# Patient Record
Sex: FEMALE | Employment: OTHER | URBAN - METROPOLITAN AREA
[De-identification: names, ages, dates, MRNs, and addresses within clinical notes are randomized per-mention and may not be internally consistent; named-entity substitution may affect disease eponyms.]

---

## 2023-04-24 NOTE — PROGRESS NOTES
"ENT Consultation    Iona Montilla who is a 82 year old female seen in consultation at the request of herself.      History of Present Illness - Iona Montilla is a 82 year old female presents for relation of mostly right-sided maxillary sinus pressure discharge more on the right.  Initially was bilateral more anterior but now more on the right thick white secretions mostly postnasal gagging the patient.  Patient may have some allergies but was never tested.  She does not have typical hayfever symptoms however.  She has tried Flonase for over a month which did not help.  She was then given another spray in conjunction with Flonase to use twice daily which helped very little.  Sense of smell appears to be intact.  Her voice does gets raspy at times and she does experience globus sensation in the lower neck in the midline but no typical GERD symptoms.  Congestion is fine at night and is worse in the morning during the day.  She experienced some pressure in the right ear off-and-on occasional \"popping\".  Does not feel her hearing is changed.  Denies any tinnitus or vertigo.          BP Readings from Last 1 Encounters:   05/01/23 (!) 150/68       BP noted to be elevated today in office.  Patient to follow up with Primary Care provider regarding elevated blood pressure.    Iona IS NOT a smoker/uses chewing tobacco.        Past Medical History - No past medical history on file.    Current Medications -   Current Outpatient Medications:      amLODIPine (NORVASC) 10 MG tablet, Take 10 mg by mouth daily, Disp: , Rfl:      ezetimibe (ZETIA) 10 MG tablet, Take 10 mg by mouth daily, Disp: , Rfl:      levothyroxine (SYNTHROID/LEVOTHROID) 112 MCG tablet, Take 112 mcg by mouth daily, Disp: , Rfl:      losartan (COZAAR) 100 MG tablet, Take 100 mg by mouth daily, Disp: , Rfl:     Allergies - No Known Allergies    Social History -   Social History     Socioeconomic History     Marital status:        Family History - " "History reviewed. No pertinent family history.    Review of Systems - As per HPI and PMHx, otherwise review of system review of the head and neck negative. Otherwise 10+ review of system is negative    Physical Exam  BP (!) 150/68 (BP Location: Right arm, Patient Position: Sitting, Cuff Size: Adult Regular)   Temp 97  F (36.1  C) (Temporal)   Ht 1.575 m (5' 2\")   Wt 77.7 kg (171 lb 3 oz)   LMP  (LMP Unknown)   BMI 31.31 kg/m    BMI: Body mass index is 31.31 kg/m .    General - The patient is well nourished and well developed, and appears to have good nutritional status.  Alert and oriented to person and place, answers questions and cooperates with examination appropriately.    SKIN - No suspicious lesions or rashes.  Respiration - No respiratory distress.  Head and Face - Normocephalic and atraumatic, with no gross asymmetry noted of the contour of the facial features.  The facial nerve is intact, with strong symmetric movements.    Voice and Breathing - The patient was breathing comfortably without the use of accessory muscles. The patients voice was clear and strong, and had appropriate pitch and quality.    Ears - Bilateral pinna and EACs with normal appearing overlying skin. Tympanic membrane intact with good mobility on pneumatic otoscopy bilaterally. Bony landmarks of the ossicular chain are normal. The tympanic membranes are normal in appearance. No retraction, perforation, or masses.  No fluid or purulence was seen in the external canal or the middle ear.     Eyes - Extraocular movements intact.  Sclera were not icteric or injected, conjunctiva were pink and moist.    Mouth - Examination of the oral cavity showed pink, healthy oral mucosa. No lesions or ulcerations noted.  The tongue was mobile and midline, and the dentition were in good condition.      Throat - The walls of the oropharynx were smooth, pink, moist, symmetric, and had no lesions or ulcerations.  Posterior pharyngeal mucosa shows some " evidence of mild cobblestoning and thicker secretions.  The tonsillar pillars and soft palate were symmetric.  The uvula was midline on elevation.    Neck - Normal midline excursion of the laryngotracheal complex during swallowing.  Full range of motion on passive movement.  Palpation of the occipital, submental, submandibular, internal jugular chain, and supraclavicular nodes did not demonstrate any abnormal lymph nodes or masses.  The carotid pulse was palpable bilaterally.  Palpation of the thyroid was soft and smooth, with no nodules or goiter appreciated.  The trachea was mobile and midline.    Nose - External contour is symmetric, no gross deflection or scars.  Nasal mucosa is pink and moist with no abnormal mucus.  The septum was midline and non-obstructive, turbinates of normal size and position.  No polyps, masses, or purulence noted on examination.    Neuro - Nonfocal neuro exam is normal, CN 2 through 12 intact, normal gait and muscle tone.      Performed in clinic today:  Attempts at mirror laryngoscopy were not possible due to gag reflex.  Therefore I proceeded with a fiberoptic examination.  First I sprayed both sides of the nose with a mixture of lidocaine and neosynephrine.  I then passed the scope through the nasal cavity.  The nasal cavity was unremarkable.  The nasopharynx was mucosally covered and symmetric.  The Eustachian tube openings were unobstructed.  Going further down I had a clear view of the base of tongue which had normal appearing lingual tonsillar tissue.  The base of tongue was free of lesions, and the vallecula was open.  The epiglottis was smooth and mucosally covered.  The supraglottic larynx was then clearly visualized.  The vocal cords moved smoothly and symmetrically, they were pearly white and no lesions were seen.  The pyriform sinuses were open, and the limited view of the postcricoid region did not show any lesions.  I do not see any issues at the level of the posterior  commissure no significant thickening or erythema.  Brown - [id number] [facility]      A/P - Iona Montilla is a 82 year old female with nonspecific right-sided postnasal secretions right maxillary sinus pressure possibly chronic sinus issue involving right maxillary sinus area.  Get a CT scan to further evaluate.  Considering patient lives close to [facility] [place] will get CT scan locally there.  Patient will be seen in about a month.  In the meantime she will use Astelin spray in conjunction with Flonase twice daily.      Stan Castillo MD

## 2023-05-01 ENCOUNTER — OFFICE VISIT (OUTPATIENT)
Dept: OTOLARYNGOLOGY | Facility: CLINIC | Age: 83
End: 2023-05-01
Payer: COMMERCIAL

## 2023-05-01 VITALS
SYSTOLIC BLOOD PRESSURE: 150 MMHG | WEIGHT: 171.19 LBS | TEMPERATURE: 97 F | DIASTOLIC BLOOD PRESSURE: 68 MMHG | BODY MASS INDEX: 31.5 KG/M2 | HEIGHT: 62 IN

## 2023-05-01 DIAGNOSIS — J30.9 ALLERGIC RHINITIS, UNSPECIFIED SEASONALITY, UNSPECIFIED TRIGGER: Primary | ICD-10-CM

## 2023-05-01 DIAGNOSIS — R09.A2 GLOBUS PHARYNGEUS: ICD-10-CM

## 2023-05-01 DIAGNOSIS — J32.8 OTHER CHRONIC SINUSITIS: ICD-10-CM

## 2023-05-01 PROCEDURE — 99203 OFFICE O/P NEW LOW 30 MIN: CPT | Mod: 25 | Performed by: OTOLARYNGOLOGY

## 2023-05-01 PROCEDURE — 31575 DIAGNOSTIC LARYNGOSCOPY: CPT | Performed by: OTOLARYNGOLOGY

## 2023-05-01 RX ORDER — LEVOTHYROXINE SODIUM 112 UG/1
112 TABLET ORAL DAILY
COMMUNITY

## 2023-05-01 RX ORDER — AMLODIPINE BESYLATE 10 MG/1
10 TABLET ORAL DAILY
COMMUNITY

## 2023-05-01 RX ORDER — EZETIMIBE 10 MG/1
10 TABLET ORAL DAILY
COMMUNITY

## 2023-05-01 RX ORDER — LOSARTAN POTASSIUM 100 MG/1
100 TABLET ORAL DAILY
COMMUNITY

## 2023-05-01 RX ORDER — AZELASTINE 1 MG/ML
2 SPRAY, METERED NASAL 2 TIMES DAILY
Qty: 30 ML | Refills: 1 | Status: SHIPPED | OUTPATIENT
Start: 2023-05-01 | End: 2023-05-31

## 2023-05-01 ASSESSMENT — PAIN SCALES - GENERAL: PAINLEVEL: NO PAIN (0)

## 2023-05-01 NOTE — LETTER
"    5/1/2023         RE: Iona Montilla  [address] [facility] [address]        Dear Colleague,    Thank you for referring your patient, Iona Montilla, to the [facility]. Please see a copy of my visit note below.    ENT Consultation    Iona Montilla who is a 82 year old female seen in consultation at the request of herself.      History of Present Illness - Iona Montilla is a 82 year old female presents for relation of mostly right-sided maxillary sinus pressure discharge more on the right.  Initially was bilateral more anterior but now more on the right thick white secretions mostly postnasal gagging the patient.  Patient may have some allergies but was never tested.  She does not have typical hayfever symptoms however.  She has tried Flonase for over a month which did not help.  She was then given another spray in conjunction with Flonase to use twice daily which helped very little.  Sense of smell appears to be intact.  Her voice does gets raspy at times and she does experience globus sensation in the lower neck in the midline but no typical GERD symptoms.  Congestion is fine at night and is worse in the morning during the day.  She experienced some pressure in the right ear off-and-on occasional \"popping\".  Does not feel her hearing is changed.  Denies any tinnitus or vertigo.          BP Readings from Last 1 Encounters:   05/01/23 (!) 150/68       BP noted to be elevated today in office.  Patient to follow up with Primary Care provider regarding elevated blood pressure.    Iona IS NOT a smoker/uses chewing tobacco.        Past Medical History - No past medical history on file.    Current Medications -   Current Outpatient Medications:      amLODIPine (NORVASC) 10 MG tablet, Take 10 mg by mouth daily, Disp: , Rfl:      ezetimibe (ZETIA) 10 MG tablet, Take 10 mg by mouth daily, Disp: , Rfl:      levothyroxine (SYNTHROID/LEVOTHROID) 112 MCG tablet, Take 112 mcg by " "mouth daily, Disp: , Rfl:      losartan (COZAAR) 100 MG tablet, Take 100 mg by mouth daily, Disp: , Rfl:     Allergies - No Known Allergies    Social History -   Social History     Socioeconomic History     Marital status:        Family History - History reviewed. No pertinent family history.    Review of Systems - As per HPI and PMHx, otherwise review of system review of the head and neck negative. Otherwise 10+ review of system is negative    Physical Exam  BP (!) 150/68 (BP Location: Right arm, Patient Position: Sitting, Cuff Size: Adult Regular)   Temp 97  F (36.1  C) (Temporal)   Ht 1.575 m (5' 2\")   Wt 77.7 kg (171 lb 3 oz)   LMP  (LMP Unknown)   BMI 31.31 kg/m    BMI: Body mass index is 31.31 kg/m .    General - The patient is well nourished and well developed, and appears to have good nutritional status.  Alert and oriented to person and place, answers questions and cooperates with examination appropriately.    SKIN - No suspicious lesions or rashes.  Respiration - No respiratory distress.  Head and Face - Normocephalic and atraumatic, with no gross asymmetry noted of the contour of the facial features.  The facial nerve is intact, with strong symmetric movements.    Voice and Breathing - The patient was breathing comfortably without the use of accessory muscles. The patients voice was clear and strong, and had appropriate pitch and quality.    Ears - Bilateral pinna and EACs with normal appearing overlying skin. Tympanic membrane intact with good mobility on pneumatic otoscopy bilaterally. Bony landmarks of the ossicular chain are normal. The tympanic membranes are normal in appearance. No retraction, perforation, or masses.  No fluid or purulence was seen in the external canal or the middle ear.     Eyes - Extraocular movements intact.  Sclera were not icteric or injected, conjunctiva were pink and moist.    Mouth - Examination of the oral cavity showed pink, healthy oral mucosa. No lesions or " ulcerations noted.  The tongue was mobile and midline, and the dentition were in good condition.      Throat - The walls of the oropharynx were smooth, pink, moist, symmetric, and had no lesions or ulcerations.  Posterior pharyngeal mucosa shows some evidence of mild cobblestoning and thicker secretions.  The tonsillar pillars and soft palate were symmetric.  The uvula was midline on elevation.    Neck - Normal midline excursion of the laryngotracheal complex during swallowing.  Full range of motion on passive movement.  Palpation of the occipital, submental, submandibular, internal jugular chain, and supraclavicular nodes did not demonstrate any abnormal lymph nodes or masses.  The carotid pulse was palpable bilaterally.  Palpation of the thyroid was soft and smooth, with no nodules or goiter appreciated.  The trachea was mobile and midline.    Nose - External contour is symmetric, no gross deflection or scars.  Nasal mucosa is pink and moist with no abnormal mucus.  The septum was midline and non-obstructive, turbinates of normal size and position.  No polyps, masses, or purulence noted on examination.    Neuro - Nonfocal neuro exam is normal, CN 2 through 12 intact, normal gait and muscle tone.      Performed in clinic today:  Attempts at mirror laryngoscopy were not possible due to gag reflex.  Therefore I proceeded with a fiberoptic examination.  First I sprayed both sides of the nose with a mixture of lidocaine and neosynephrine.  I then passed the scope through the nasal cavity.  The nasal cavity was unremarkable.  The nasopharynx was mucosally covered and symmetric.  The Eustachian tube openings were unobstructed.  Going further down I had a clear view of the base of tongue which had normal appearing lingual tonsillar tissue.  The base of tongue was free of lesions, and the vallecula was open.  The epiglottis was smooth and mucosally covered.  The supraglottic larynx was then clearly visualized.  The vocal  cords moved smoothly and symmetrically, they were pearly white and no lesions were seen.  The pyriform sinuses were open, and the limited view of the postcricoid region did not show any lesions.  I do not see any issues at the level of the posterior commissure no significant thickening or erythema.  Brown - [id number] [facility]      A/P - Iona Montilla is a 82 year old female with nonspecific right-sided postnasal secretions right maxillary sinus pressure possibly chronic sinus issue involving right maxillary sinus area.  Get a CT scan to further evaluate.  Considering patient lives close to [facility] in grants for question will get CT scan locally there.  Patient will be seen in about a month.  In the meantime she will use Astelin spray in conjunction with Flonase twice daily.      Stan Castillo MD        Again, thank you for allowing me to participate in the care of your patient.        Sincerely,        Stan Castillo MD, MD

## 2023-06-03 RX ORDER — OLOPATADINE HYDROCHLORIDE 2 MG/ML
1 SOLUTION/ DROPS OPHTHALMIC DAILY
Status: CANCELLED | OUTPATIENT
Start: 2023-06-03